# Patient Record
Sex: MALE
[De-identification: names, ages, dates, MRNs, and addresses within clinical notes are randomized per-mention and may not be internally consistent; named-entity substitution may affect disease eponyms.]

---

## 2017-09-14 NOTE — CP.PCM.HP
History of Present Illness





- History of Present Illness


History of Present Illness: 





COMPREHENSIVE   HISTORY & PHYSICAL EXAM 


   


HPI


RLQ PAIN SINCE LAST 48 HRS NOT A/W N/V/FEVER/CHILLS 


CT ABD. INFLAMMATION OF STUMP APPENDIX WITH FECOLITH 


HAD APPENDIDECTOMY FEW YEARS AGO





PAST HIST.


DAISY/ANXIETY/FX LEFT FOOT 


PERSONAL HIST:   Smoking.   N   Alcohol.   N      Allergy N            Travel_-

      .


FAMILY HIST : 


ROS :


Constitutional: Negative for weight change, chills, night sweats 


  Eyes: Negative for redness, swelling, itching, discharge, vision changes, 

blurry vision, double vision, glaucoma, cataracts, 


  Ears: Negative for hearing loss, ringing, , tinnitus, vertigo


 Nose: Negative for rhinorrhea, stuffiness, sniffing, itching, postnasal drip, 

discoloration, nasal congestion and epistaxis. 


 Throat: Negative for throat clearing, sore throat, hoarseness, difficulty 

swallowing and difficulty speaking. 


  Respiratory: Negative for cough, , sputum production, chest tightness,  

wheezing, pleuritic chest pain ,daytime somnolence, chronic cough, hemoptysis, 

snoring at night,


 Cardiovascular: Negative for chest pain, palpitations, orthopnea, PND, Edema 

of legs, leg cramps, angina, claudication, , irregular heartbeat,


 Neurology: Negative for irritability, muscle weakness, numbness and tingling, 

seizures, tremors, migraines, slurred speech, syncope, memory loss, mood changes

, recurrent headaches 


Gastrointestinal: Negative for difficulty swallowing, diarrhea, constipation, 

black stools, rectal bleeding, nausea, flatulence, reflux, poor appetite, 

changes in bowel habits  POS , abdominal pain


  Genitourinary: Negative for frequent urination, hematuria, discharge, 

incontinence, urinary retention, frequent UTI, Psychiatric: Negative for 

depression, anxiety/panic, suicidal tendencies, 


Musculoskeletal: Negative for swollen joints, back pain, , neck pain, morning 

stiffness of joints, . 


 Skin: Negative for rash, ulcers, itching, dry skin and pigmented lesions.


P/E: 


  Constitutional: Appears stated age and in no apparent distress. 


 Head: Normocephalic. 


  Ears: External ear canals patent without inflammation. Tympanic membranes 

intact with normal light reflex and landmark. 


  Eyes: Pupils are central, bilaterally equal, symmetrical and reacts to light 

with normal movements and no icterus or pallor. 


 Nose: External nares are patent. Mucosa is pink  Mouth-Throat: Good general 

appearance and condition. No post-pharyngeal/oropharyngeal erythema and 

tonsillar hypertrophy. Good dental hygiene. 


  Neck-Lymphatic: Neck is supple with normal ROM, no thyromegaly, lymph nodes 

or masses. JVD is normal with no carotid bruit. 


  Lungs: Clear to percussion and auscultation with bilateral normal air entry. 


  Cardiovascular: S1 and S2 are normal with no murmurs, gallops and rub. 


  GI Exam: No hepatomegaly. Abdomen is soft and tender RLQ . No Organomegaly , 

masses or hernias are evident and bowel sounds are normal and active. 


  Neurology: Higher function and all cranial nerves intact, with no gross motor 

or sensory deficit. Superficial and deep reflexes are normal with downwards 

planters. No cerebellar deficit with normal gait. 


  Musculoskeletal: No tender spots with normal curvature of the spine with no 

swelling or restricted ROM of the small and large joints. 


  Extremities: Homans sign absent. Intact pulses with no pitting edema, calf 

tenderness or skin color changes. 


  Skin: No rash, eruptions or abnormal skin pigmentation





LAB/RADIOLOGY:


ASSESMENT :


STUMP APPENDICITIS 


DAISY 





PLAN: 


SURGICAL EVAL FOR OR 


IV AB 








Present on Admission





- Present on Admission


Any Indicators Present on Admission: No





Past Patient History





- Infectious Disease


Hx of Infectious Diseases: None





- Past Social History


Smoking Status: Never Smoked





- PSYCHIATRIC


Hx Substance Use: Yes (Marijuana)





- SURGICAL HISTORY


Hx Appendectomy: Yes





- ANESTHESIA


Hx Anesthesia: Yes


Hx Anesthesia Reactions: No


Hx Malignant Hyperthermia: No





Meds


Allergies/Adverse Reactions: 


 Allergies











Allergy/AdvReac Type Severity Reaction Status Date / Time


 


No Known Allergies Allergy   Verified 06/03/16 18:00














Results





- Vital Signs


Recent Vital Signs: 





 Last Vital Signs











Temp  98.1 F   09/14/17 11:30


 


Pulse  68   09/14/17 11:30


 


Resp  18   09/14/17 11:30


 


BP  103/60   09/14/17 11:30


 


Pulse Ox  97   09/14/17 11:30














- Labs


Result Diagrams: 


 09/14/17 04:33





 09/14/17 04:33

## 2017-09-14 NOTE — C.PDOC
History Of Present Illness


27 year old male who presents to the ER after waking up with RLQ pain, 

associated with constipation. Patient reports he has a SHx of appendectomy at 6 yo in [place]; denies fever, nausea, or vomiting.


Time Seen by Provider: 09/14/17 04:14


Chief Complaint (Nursing): Abdominal Pain


History Per: Patient


History/Exam Limitations: no limitations


Onset/Duration Of Symptoms: Hrs


Current Symptoms Are (Timing): Still Present


Location Of Pain/Discomfort: RLQ


Radiation Of Pain To:: None


Quality Of Discomfort: Unable To Describe


Associated Symptoms: Constipation.  denies: Fever, Nausea, Vomiting


Exacerbating Factors: None


Alleviating Factors: None


Recent travel outside of the [place]: No





Past Medical History


Reviewed: Historical Data, Nursing Documentation, Vital Signs


Vital Signs: 


 Last Vital Signs











Temp  98.4 F   09/14/17 03:58


 


Pulse  100 H  09/14/17 03:58


 


Resp  16   09/14/17 03:58


 


BP  125/82   09/14/17 03:58


 


Pulse Ox  100   09/14/17 06:48














- Medical History


PMH: No Chronic Diseases


Surgical History: Appendectomy


Family History: States: Unknown Family Hx





- Social History


Hx Alcohol Use: Yes


Hx Substance Use: Yes (Marijuana)





- Immunization History


Hx Tetanus Toxoid Vaccination: No


Hx Influenza Vaccination: No


Hx Pneumococcal Vaccination: No





Review Of Systems


Constitutional: Negative for: Fever, Chills


Gastrointestinal: Positive for: Abdominal Pain, Constipation.  Negative for: 

Nausea, Vomiting





Physical Exam





- Physical Exam


Appears: Non-toxic, Other (uncomfortable)


Skin: Normal Color, Warm, Dry


Head: Atraumatic, Normacephalic


Eye(s): bilateral: Normal Inspection, EOMI


Nose: Normal


Oral Mucosa: Moist


Chest: Symmetrical, No Tenderness


Cardiovascular: Rhythm Regular, No Murmur


Respiratory: Normal Breath Sounds, No Rales, No Rhonchi, No Wheezing


Gastrointestinal/Abdominal: Soft, Tenderness (RLQ)


Neurological/Psych: Oriented x3, Normal Speech, Normal Cognition





ED Course And Treatment





- Laboratory Results


Result Diagrams: 


 09/14/17 04:33





 09/14/17 04:33


O2 Sat by Pulse Oximetry: 100 (Room air)


Pulse Ox Interpretation: Normal





- CT Scan/US


  ** CT abd/pel


Other Rad Studies (CT/US): Read By Radiologist, Radiology Report Reviewed


CT/US Interpretation: EXAM:  CT Abdomen and Pelvis With Intravenous Contrast.  

CLINICAL HISTORY:  27 years old, male; Pain; Abdominal pain.  TECHNIQUE:  Axial 

computed tomography images of the abdomen and pelvis with intravenous contrast. 

All CT.  scans at this facility use one or more dose reduction techniques, viz.

: automated exposure control;.  ma/kV adjustment per patient size (including 

targeted exams where dose is matched to indication; i.e.  head); or iterative 

reconstruction technique.  Coronal and sagittal reformatted images were created 

and reviewed.  CONTRAST:  100 mL of [id number] administered intravenously.  

COMPARISON:  No relevant prior studies available.  FINDINGS:  Lower thorax: 

There is minimal bibasilar atelectasis.  ABDOMEN:  Liver: There are no focal 

liver lesions present.  Gallbladder and bile ducts: The gallbladder is 

contracted but otherwise normal. No calcified stones.  No ductal dilation.  

Pancreas: The pancreas is normal. No ductal dilation.  Spleen: The spleen is 

normal.  Adrenals: The adrenal glands are normal.  Kidneys and ureters: The 

kidneys are normal. No hydronephrosis.  Stomach and bowel: Stomach is 

decompressed. There is no evidence of intestinal obstruction. No.  mucosal 

thickening.  Appendix: There is an ill-defined and grossly abnormal appendix in 

the right lower quadrant with an.  appendicolith. Findings compatible with 

situs. Early perforation is not excluded based on the illdefined.  appearance 

of the appendix. No evidence for organized abscess at this time.  PELVIS:  

Bladder: Bladder is decompressed.  Reproductive: The prostate gland and seminal 

vesicles are normal.  ABDOMEN and PELVIS:  Intraperitoneal space: There is no 

evidence of free intraperitoneal fluid. There is no free.  intraperitoneal air.

  Bones/joints: No acute fracture. No dislocation.  Soft tissues: Unremarkable.

  Vasculature: The aorta is normal. No abdominal aortic aneurysm.  Lymph nodes: 

There is no evidence of lymphadenopathy.  IMPRESSION:  There is an ill-defined 

and grossly abnormal appendix in the right lower quadrant with an.  

appendicolith. Findings compatible with situs. Early perforation is not 

excluded based on the.  ill-defined appearance of the appendix. No evidence for 

organized abscess at this time.


Progress Note: Blood work, urinalysis, and CT abd/pel ordered. Toradol and IV 

fluids administered.





Disposition





- Disposition


Disposition Time: 07:05


Condition: STABLE


Forms:  [employer] (English)





- Clinical Impression


Clinical Impression: 


 Appendicitis








- Scribe Statement


The provider has reviewed the documentation as recorded by the Scribe





Aaron Mcguire





All medical record entries made by the Scribe were at my direction and 

personally dictated by me. I have reviewed the chart and agree that the record 

accurately reflects my personal performance of the history, physical exam, 

medical decision making, and the department course for this patient. I have 

also personally directed, reviewed, and agree with the discharge instructions 

and disposition.





Physician Patient Turnover


Patient Signed Over To: Kaitlin Whatley


Handoff Comments: Pending surgical evaluation.

## 2017-09-14 NOTE — CP.PCM.CON
History of Present Illness





- History of Present Illness


History of Present Illness: 





General Surgery - Dr. Chan





28yo M w/ hx of DAISY, anxiety, appendectomy 20yrs ago in [place], presenting w

/ RLQ abdominal pain since Tuesday night.  Pt states the pain began around 7pm 

and was sharp, located in the RLQ abdomen right below prior surgical scar, 

worse with sitting up and moving around.  He states the pain gradually became 

worse and he had to leave class early yesterday, then finally decided to come 

to the ED.  Pt admits to subjective feeling of fever/chills, denies any Nausea, 

vomiting, SOb/Chest pain, Dysuria, Hematuria, Constipation, Diarrhea. 





PMH: DAISY, Anxiety, ankle fx


PSH: appendectomy, tonsillectomy


No meds


NKDA\





Pt was seen in the Ed.  Labs significant for WBC of 17.2, Tbili also elevated 

at 3.3.  CT abdomen/pelvis was done which showed inflammation surrounding the 

cecum with a fecalith/appendicolith visualized, poss. suggesting stump 

appendicitis. 





Review of Systems





- Review of Systems


All systems: reviewed and no additional remarkable complaints except (as per HPI

)





Past Patient History





- Infectious Disease


Hx of Infectious Diseases: None





- Past Social History


Smoking Status: Never Smoked





- PSYCHIATRIC


Hx Substance Use: Yes (Marijuana)





- SURGICAL HISTORY


Hx Appendectomy: Yes





- ANESTHESIA


Hx Anesthesia: Yes


Hx Anesthesia Reactions: No


Hx Malignant Hyperthermia: No





Meds


Allergies/Adverse Reactions: 


 Allergies











Allergy/AdvReac Type Severity Reaction Status Date / Time


 


No Known Allergies Allergy   Verified 06/03/16 18:00














- Medications


Medications: 


 Current Medications





Sodium Chloride (Sodium Chloride 0.9%)  1,000 mls @ 100 mls/hr IV .Q10H [facility]


   Last Admin: 09/14/17 09:45 Dose:  100 mls/hr


Piperacillin Sod/Tazobactam Sod (Zosyn 3.375 Gm Iv Premix)  3.375 gm in 50 mls 

@ 100 mls/hr IVPB Q6H [facility]


Morphine Sulfate (Morphine)  4 mg IVP Q4H [facility]


Ondansetron HCl (Zofran Inj)  4 mg IVP Q6 PRN


   PRN Reason: Nausea/Vomiting


Pantoprazole Sodium (Protonix Inj)  40 mg IVP DAILY [facility]











Physical Exam





- Constitutional


Appears: No Acute Distress





- Head Exam


Head Exam: ATRAUMATIC, NORMAL INSPECTION, NORMOCEPHALIC





- Eye Exam


Eye Exam: Normal appearance





- Respiratory Exam


Respiratory Exam: NORMAL BREATHING PATTERN.  absent: Respiratory Distress





- Cardiovascular Exam


Cardiovascular Exam: REGULAR RHYTHM





- GI/Abdominal Exam


GI & Abdominal Exam: Guarding (rlq), Rebound, Soft, Tenderness (RLQ, Mcburney's 

point ttp).  absent: Distended, Hernia, Rigid





- Neurological Exam


Neurological exam: Alert, Oriented x3





- Psychiatric Exam


Psychiatric exam: Normal Affect, Normal Mood





- Skin


Skin Exam: Dry, Intact





Results





- Vital Signs


Recent Vital Signs: 


 Last Vital Signs











Temp  98.2 F   09/14/17 07:40


 


Pulse  73   09/14/17 07:40


 


Resp  16   09/14/17 03:58


 


BP  95/58 L  09/14/17 07:40


 


Pulse Ox  97   09/14/17 07:40














- Labs


Result Diagrams: 


 09/14/17 04:33





 09/14/17 04:33


Labs: 


 Laboratory Results - last 24 hr











  09/14/17 09/14/17 09/14/17





  04:33 04:33 05:46


 


WBC  17.2 H D  


 


RBC  5.01  


 


Hgb  15.6  


 


Hct  45.2  


 


MCV  90.2  


 


MCH  31.2 H  


 


MCHC  34.5  


 


RDW  13.6  


 


Plt Count  239  


 


MPV  8.0  


 


Neut % (Auto)  78.5 H  


 


Lymph % (Auto)  10.8 L  


 


Mono % (Auto)  8.6  


 


Eos % (Auto)  1.5  


 


Baso % (Auto)  0.6  


 


Neut #  13.5 H  


 


Lymph #  1.9  


 


Mono #  1.5 H  


 


Eos #  0.3  


 


Baso #  0.1  


 


Sodium   137 


 


Potassium   3.8 


 


Chloride   97 L 


 


Carbon Dioxide   24 


 


Anion Gap   20 


 


BUN   15 


 


Creatinine   0.6 L 


 


Est GFR ( Amer)   > 60 


 


Est GFR (Non-Af Amer)   > 60 


 


Random Glucose   95 


 


Calcium   9.3 


 


Total Bilirubin   3.3 H 


 


AST   21 


 


ALT   32 


 


Alkaline Phosphatase   64 


 


Total Protein   7.5 


 


Albumin   4.3 


 


Globulin   3.3 


 


Albumin/Globulin Ratio   1.3 


 


Urine Color    Yellow


 


Urine Clarity    Clear


 


Urine pH    7.0


 


Ur Specific Gravity    1.020


 


Urine Protein    Negative


 


Urine Glucose (UA)    Normal


 


Urine Ketones    Negative


 


Urine Blood    Negative


 


Urine Nitrate    Negative


 


Urine Bilirubin    Negative


 


Urine Urobilinogen    2.0


 


Ur Leukocyte Esterase    Neg


 


Urine WBC (Auto)    < 1


 


Urine RBC (Auto)    < 1














- Imaging and Cardiology


  ** CT scan - abdomen


Status: Image reviewed by me, Report reviewed by me





Assessment & Plan





- Assessment and Plan (Free Text)


Assessment: 





28 yo M w/ RLQ abdominal pain, s/p appendectomy 20yrs ago, CT suggestive of 

stump appendicitis





-Admitted to medical service


-Cont. IV Abx, IVF


-Clear liquid diet


-Conservative Tx for now, will monitor and plan for OR tomorrow if no 

improvement





Dw Dr Rocio Pedersen PGY3


-

## 2017-09-14 NOTE — CT
EXAM:

  CT Abdomen and Pelvis With Intravenous Contrast



CLINICAL HISTORY:

  27 years old, male; Pain; Abdominal pain



TECHNIQUE:

  Axial computed tomography images of the abdomen and pelvis with intravenous 

contrast.  All CT scans at this facility use one or more dose reduction 

techniques, viz.: automated exposure control; ma/kV adjustment per patient size 

(including targeted exams where dose is matched to indication; i.e. head); or 

iterative reconstruction technique.

  Coronal and sagittal reformatted images were created and reviewed.



CONTRAST:

  100 mL of [id number] administered intravenously.



COMPARISON:

  No relevant prior studies available.



FINDINGS:

  Lower thorax:  There is minimal bibasilar atelectasis.



 ABDOMEN:

  Liver:  There are no focal liver lesions present.

  Gallbladder and bile ducts:  The gallbladder is contracted but otherwise 

normal.  No calcified stones.  No ductal dilation.

  Pancreas:  The pancreas is normal.  No ductal dilation.

  Spleen:  The spleen is normal.

  Adrenals:  The adrenal glands are normal.

  Kidneys and ureters:  The kidneys are normal.  No hydronephrosis.

  Stomach and bowel:  Stomach is decompressed.  There is no evidence of 

intestinal obstruction.  No mucosal thickening.

  Appendix:  There is an ill-defined and grossly abnormal appendix in the right 

lower quadrant with an appendicolith.  Findings compatible with situs.  Early 

perforation is not excluded based on the ill-defined appearance of the 

appendix.  No evidence for organized abscess at this time.



 PELVIS:

  Bladder:  Bladder is decompressed.

  Reproductive:  The prostate gland and seminal vesicles are normal.



 ABDOMEN and PELVIS:

  Intraperitoneal space:  There is no evidence of free intraperitoneal fluid.  

There is no free intraperitoneal air.

  Bones/joints:  No acute fracture.  No dislocation.

  Soft tissues:  Unremarkable.

  Vasculature:  The aorta is normal.  No abdominal aortic aneurysm.

  Lymph nodes:  There is no evidence of lymphadenopathy.



IMPRESSION:     

  There is an ill-defined and grossly abnormal appendix in the right lower 

quadrant with an appendicolith.  Findings compatible with situs.  Early 

perforation is not excluded based on the ill-defined appearance of the 

appendix.  No evidence for organized abscess at this time.

## 2017-09-15 NOTE — CT
PROCEDURE:  CT Abdomen and Pelvis with contrast



HISTORY:

Compare right lower quadrant abscess. 



Relevant surgical history: Prior appendectomy lymph 



COMPARISON:

September 14, 2017. 



TECHNIQUE:

Contrast dose: Oral contrast only. 



Radiation dose:



Total exam DLP = 463.34 mGy-cm.



This CT exam was performed using one or more of the following dose 

reduction techniques: Automated exposure control, adjustment of the 

mA and/or kV according to patient size, and/or use of iterative 

reconstruction technique.



FINDINGS:



LOWER THORAX:

Unremarkable. 



LIVER:

Unremarkable. No gross lesion or ductal dilatation. 



GALLBLADDER AND BILE DUCTS:

Unremarkable. 



PANCREAS:

Unremarkable. No gross lesion or ductal dilatation.



SPLEEN:

Unremarkable. 



ADRENALS:

Unremarkable. No mass. 



KIDNEYS AND URETERS:

Unremarkable. No hydronephrosis. No solid mass. 



VASCULATURE:

Unremarkable. No aortic aneurysm. 



BOWEL:

Irregular mass at the base of the cecum better delineated on the 

current study given the oral contrast. Inflammatory, phlegmonous 

process measures 3.2 x 3.6 cm. Inflammatory changes, edema noted with 

respect to the adjacent right psoas muscle. There is no drainable 

component to this collection.



Diverticulosis without an acute inflammatory component or other 

associated pathologic process.



APPENDIX:

Prior appendectomy. 



PERITONEUM:

Unremarkable. No free fluid. No free air. 



LYMPH NODES:

Unremarkable. No enlarged lymph nodes. 



BLADDER:

Unremarkable. 



REPRODUCTIVE:

Unremarkable. 



BONES:

No acute fracture. 



OTHER FINDINGS:

None.



IMPRESSION:

Phlegmonous, inflammatory process base of cecum. Local extension of 

into right lower quadrant fat and adjacent psoas muscle. This 

measures 3.2 x 3.6 cm. No drainable component.  Accounting for 

differences in technique, approximate stability compared to the prior 

study September 14, 2017 completed at 05:48.

## 2017-09-15 NOTE — CP.PCM.PN
Subjective





- Date & Time of Evaluation


Date of Evaluation: 09/15/17


Time of Evaluation: 07:19





- Subjective


Subjective: 


Patient was seen and examined at bedside. Patient reports feeling better today 

and pain has improved. Patient is tolerating liquid diet. 12 point review of 

systems otherwise negative.





Objective





- Vital Signs/Intake and Output


Vital Signs (last 24 hours): 


 











Temp Pulse Resp BP Pulse Ox


 


 98.7 F   88   20   112/68   96 


 


 09/15/17 00:00  09/15/17 00:00  09/15/17 00:00  09/15/17 00:00  09/15/17 00:00








Intake and Output: 


 











 09/15/17 09/15/17





 06:59 18:59


 


Intake Total 1890 


 


Output Total 600 


 


Balance 1290 














- Medications


Medications: 


 Current Medications





Sodium Chloride (Sodium Chloride 0.9%)  1,000 mls @ 100 mls/hr IV .Q10H [facility]


   Last Admin: 09/15/17 06:11 Dose:  Not Given


Piperacillin Sod/Tazobactam (Sod 3.375 gm/ Sodium Chloride)  100 mls @ 100 mls/

hr IVPB Q6H [facility]


   Last Admin: 09/15/17 03:32 Dose:  100 mls/hr


Morphine Sulfate (Morphine)  4 mg IVP Q4H PRN


   PRN Reason: Pain, moderate (4-7)


   Last Admin: 09/14/17 23:59 Dose:  4 mg


Ondansetron HCl (Zofran Inj)  4 mg IVP Q6 PRN


   PRN Reason: Nausea/Vomiting


Pantoprazole Sodium (Protonix Inj)  40 mg IVP DAILY [facility]


   Last Admin: 09/14/17 11:29 Dose:  40 mg











- Head Exam


Head Exam: ATRAUMATIC, NORMAL INSPECTION





- Eye Exam


Eye Exam: EOMI, Normal appearance





- ENT Exam


ENT Exam: Mucous Membranes Moist





- Respiratory Exam


Respiratory Exam: NORMAL BREATHING PATTERN.  absent: Respiratory Distress





- Cardiovascular Exam


Cardiovascular Exam: +S1, +S2.  absent: Bradycardia, Tachycardia





- GI/Abdominal Exam


GI & Abdominal Exam: Soft.  absent: Distended, Tenderness





- Neurological Exam


Neurological Exam: Alert, Awake, Oriented x3





- Psychiatric Exam


Psychiatric exam: Normal Affect, Normal Mood





- Skin


Skin Exam: Dry, Intact, Normal Color, Warm





Assessment and Plan





- Assessment and Plan (Free Text)


Assessment: 


27 year old male with CT suggesting stump appendicitis, history of appendectomy 

20 years ago.





- Continue clear liquid diet. 


- Continue antibiotics, pain control, and IV fluids.


- Continue medical management as per hospitalist team.

## 2017-09-15 NOTE — CP.PCM.PN
Subjective





- Date & Time of Evaluation


Date of Evaluation: 09/15/17


Time of Evaluation: 13:53





- Subjective


Subjective: 





AFEBRILE 


WBC DOWN TO 11.9


LESS PAIN 


NO N/V 


SURGERY : NO INTERVENTION 


ID FOR IV AB 





Objective





- Vital Signs/Intake and Output


Vital Signs (last 24 hours): 


 











Temp Pulse Resp BP Pulse Ox


 


 98.7 F   88   20   112/68   96 


 


 09/15/17 00:00  09/15/17 00:00  09/15/17 00:00  09/15/17 00:00  09/15/17 00:00








Intake and Output: 


 











 09/15/17 09/15/17





 11:59 23:59


 


Intake Total 850 


 


Balance 850 














- Medications


Medications: 


 Current Medications





Sodium Chloride (Sodium Chloride 0.9%)  1,000 mls @ 100 mls/hr IV .Q10H [facility]


   Last Admin: 09/15/17 06:11 Dose:  Not Given


Piperacillin Sod/Tazobactam Sod (Zosyn 3.375 Gm Iv Premix)  3.375 gm in 50 mls 

@ 100 mls/hr IVPB Q6H [facility]


   Last Admin: 09/15/17 08:11 Dose:  100 mls/hr


Morphine Sulfate (Morphine)  4 mg IVP Q4H PRN


   PRN Reason: Pain, moderate (4-7)


   Last Admin: 09/15/17 07:45 Dose:  4 mg


Ondansetron HCl (Zofran Inj)  4 mg IVP Q6 PRN


   PRN Reason: Nausea/Vomiting


   Last Admin: 09/15/17 09:57 Dose:  4 mg


Pantoprazole Sodium (Protonix Inj)  40 mg IVP DAILY [facility]


   Last Admin: 09/15/17 09:59 Dose:  40 mg


Potassium Chloride (K-Dur 20 Meq Er Tab)  20 meq PO ONCE ONE


   Stop: 09/15/17 14:01











- Labs


Labs: 


 





 09/15/17 08:02 





 09/15/17 08:02

## 2017-09-15 NOTE — CP.PCM.CON
History of Present Illness





- History of Present Illness


History of Present Illness: 


INFECTIOUS DISEASE CONSULT.


HPI;


26yo M w/ hx of DAISY, anxiety, appendectomy 20yrs ago in [place], presenting w

/ RLQ abdominal pain since Tuesday night.  Pt states the pain began around 7pm 

and was sharp, located in the RLQ abdomen right below prior surgical scar, 

worse with sitting up and moving around.  He states the pain gradually became 

worse and he had to leave class early yesterday, then finally decided to come 

to the ED.  Pt admits to subjective feeling of fever/chills, denies any Nausea, 

vomiting, SOB/Chest pain, Dysuria, Hematuria, Constipation, Diarrhea. 





On admission patient had leukocytosis of 17.2 with elevated bilirubin of 3.3.


CT of the abdomen and pelvis with IV contrast showed inflammation surrounding 

the cecum with fecalith/appendicolith visualized possible suggesting stump 

appendicitis.


Patient was appropriately cultured and started on IV Zosyn 3.375 every 6 hourly.





Infectious disease consultation requested by PMD for further evaluation.





Patient went for repeat CT of the abdomen and pelvis with by mouth contrast 

today which showed inflammatory phlegmonous process 3.2 x 3.6 cm at the base of 

cecum with local extension into the right lower quadrant/adjacent PSOAS muscle.

  No drainable component was noted.NO LYMPHADENOPATHY WAS SEEN.


Patient continues to have pain right lower quadrant radiating to the back.  

Presently he states it is 6 /10 count.


Patient denies any melena.  Denies any diarrhea at present.








PMH: DAISY, Anxiety, ankle fx


PSH: appendectomy, tonsillectomy


No meds


ALLERGY;  NKDA











Review of Systems





- Constitutional


Constitutional: Chills, Fever (ON ADMISSION.)





- EENT


Eyes: absent: Photophobia


Nose/Mouth/Throat: absent: Mouth Lesions





- Cardiovascular


Cardiovascular: absent: Chest Pain, Dyspnea





- Respiratory


Respiratory: absent: Cough, Hemoptysis





- Gastrointestinal


Gastrointestinal: Abdominal Pain (RIGHT LOWER QUADRANT RADIATING TO THE BACK.), 

Constipation.  absent: Diarrhea, Nausea





- Genitourinary


Genitourinary: absent: Dysuria, Hematuria, Bladder Distension





- Musculoskeletal


Musculoskeletal: absent: Back Pain





- Neurological


Neurological: absent: Headaches





- Hematologic/Lymphatic


Hematologic: As Per HPI.  absent: Easy Bleeding, Easy Bruising, Lymphadenopathy





Past Patient History





- Infectious Disease


Hx of Infectious Diseases: None





- Past Medical History & Family History


Past Medical History?: Yes





- Past Social History


Smoking Status: Never Smoked





- CARDIAC


Hx Cardiac Disorders: No





- PULMONARY


Hx Sleep Apnea: Yes





- NEUROLOGICAL


Hx Neurological Disorder: No





- HEENT


Hx HEENT Problems: No





- RENAL


Hx Chronic Kidney Disease: No





- ENDOCRINE/METABOLIC


Hx Endocrine Disorders: No





- HEMATOLOGICAL/ONCOLOGICAL


Hx Blood Disorders: No





- INTEGUMENTARY


Hx Dermatological Problems: No





- MUSCULOSKELETAL/RHEUMATOLOGICAL


Hx Musculoskeletal Disorders: No


Hx Falls: No





- GASTROINTESTINAL


Hx Gastrointestinal Disorders: No





- GENITOURINARY/GYNECOLOGICAL


Hx Genitourinary Disorders: No





- PSYCHIATRIC


Hx Substance Use: Yes (sheron)





- SURGICAL HISTORY


Hx Appendectomy: Yes


Hx Tonsillectomy: Yes





- ANESTHESIA


Hx Anesthesia: Yes


Hx Anesthesia Reactions: No


Hx Malignant Hyperthermia: No





Meds


Allergies/Adverse Reactions: 


 Allergies











Allergy/AdvReac Type Severity Reaction Status Date / Time


 


No Known Allergies Allergy   Verified 06/03/16 18:00














- Medications


Medications: 


 Current Medications





Sodium Chloride (Sodium Chloride 0.9%)  1,000 mls @ 100 mls/hr IV .Q10H [facility]


   Last Admin: 09/15/17 14:45 Dose:  100 mls/hr


Piperacillin Sod/Tazobactam Sod (Zosyn 3.375 Gm Iv Premix)  3.375 gm in 50 mls 

@ 100 mls/hr IVPB Q6H [facility]


   Last Admin: 09/15/17 14:39 Dose:  100 mls/hr


Morphine Sulfate (Morphine)  4 mg IVP Q4H PRN


   PRN Reason: Pain, moderate (4-7)


   Last Admin: 09/15/17 07:45 Dose:  4 mg


Ondansetron HCl (Zofran Inj)  4 mg IVP Q6 PRN


   PRN Reason: Nausea/Vomiting


   Last Admin: 09/15/17 09:57 Dose:  4 mg


Pantoprazole Sodium (Protonix Inj)  40 mg IVP DAILY [facility]


   Last Admin: 09/15/17 09:59 Dose:  40 mg











Physical Exam





- Constitutional


Appears: No Acute Distress





- Head Exam


Head Exam: NORMAL INSPECTION





- Eye Exam


Eye Exam: EOMI, PERRL, Scleral icterus





- ENT Exam


ENT Exam: Normal Oropharynx





- Neck Exam


Neck exam: Positive for: Normal Inspection





- Respiratory Exam


Respiratory Exam: Clear to Auscultation Bilateral, NORMAL BREATHING PATTERN





- Cardiovascular Exam


Cardiovascular Exam: REGULAR RHYTHM, +S1, +S2





- GI/Abdominal Exam


GI & Abdominal Exam: Normal Bowel Sounds, Soft, Tenderness (RIGHT LOWER 

QUADRANT WITH SOME TENDERNESS RADIATING TO THE BACK.SCAR OF PREVIOUS SURGERY 

NOTED RIGHT LOWER QUADRANT.).  absent: Organomegaly





- Extremities Exam


Extremities exam: Positive for: pedal pulses present.  Negative for: calf 

tenderness, pedal edema





- Neurological Exam


Neurological exam: Alert, CN II-XII Intact, Oriented x3, Reflexes Normal





- Psychiatric Exam


Psychiatric exam: Normal Mood





- Skin


Skin Exam: Normal Color, Warm





Results





- Vital Signs


Recent Vital Signs: 


 Last Vital Signs











Temp  98.7 F   09/15/17 00:00


 


Pulse  88   09/15/17 00:00


 


Resp  20   09/15/17 00:00


 


BP  112/68   09/15/17 00:00


 


Pulse Ox  96   09/15/17 00:00














- Labs


Result Diagrams: 


 09/15/17 08:02





 09/15/17 08:02


Labs: 


 Laboratory Results - last 24 hr











  09/15/17 09/15/17





  08:02 08:02


 


WBC  11.9 H 


 


RBC  4.52 


 


Hgb  14.2 


 


Hct  41.2 


 


MCV  91.3 


 


MCH  31.5 H 


 


MCHC  34.5 


 


RDW  13.4 


 


Plt Count  213 


 


MPV  8.0 


 


Neut % (Auto)  72.5 


 


Lymph % (Auto)  13.8 L 


 


Mono % (Auto)  11.5 H 


 


Eos % (Auto)  1.9 


 


Baso % (Auto)  0.3 


 


Neut #  8.7 H 


 


Lymph #  1.6 


 


Mono #  1.4 H 


 


Eos #  0.2 


 


Baso #  0.0 


 


Sodium   136


 


Potassium   3.5 L


 


Chloride   99


 


Carbon Dioxide   25


 


Anion Gap   16


 


BUN   8 L


 


Creatinine   0.7 L


 


Est GFR ( Amer)   > 60


 


Est GFR (Non-Af Amer)   > 60


 


Random Glucose   92


 


Calcium   9.1


 


Total Bilirubin   4.6 H


 


AST   22


 


ALT   30


 


Alkaline Phosphatase   68


 


Total Protein   6.8


 


Albumin   3.8


 


Globulin   3.1


 


Albumin/Globulin Ratio   1.2














- Imaging and Cardiology


  ** CT scan - abdomen/PELVIS WITH BY MOUTH CONTRAST


Additional comment: 





SEE FULL REPORT.





Assessment & Plan


(1) Acute cecitis


Status: Acute   





(2) Cecal diverticulitis


Status: Acute   





(3) Stercolith of appendix


Status: Acute   





(4) Sleep apnea syndrome


Status: Acute   





- Assessment and Plan (Free Text)


Plan: 





PLAN;


PANCULTURES.


CRP,ESR.


HEPATITIS SCREEN A /B.


HEPATITIS c ANTIBODY


LIVER FUNCTION TESTS





CONTINUE iv zOSYN 3.375 DECREASE  DOSE  EVERY 8 HOURLY 9/14/17.


ADD IV VANCOMYCIN 1 G EVERY 12 HOURLY FOR GRAM-POSITIVE AND mrsa COVERAGE.


fOLLOW-UP vANCO TROUGH LEVELS PRIOR TO THE FOURTH DOSE AND KEEP THEM BETWEEN 10 

AND 20.





fOLLOW-UP RENAL FUNCTIONS


fOLLOW-UP lftS CLOSELY..





WILL DISCUSS WITH SURGERY.


CASE DISCUSSED WITH FAMILY AT BEDSIDE.

## 2017-09-15 NOTE — CP.PCM.PN
Subjective





- Date & Time of Evaluation


Date of Evaluation: 09/15/17


Time of Evaluation: 07:13





- Subjective


Subjective: 





General Surgery - Dr. Chan








PT S&E.  ROSARIO.  Pt doing well this morning and states his pain is improving.  

He tolerated liquid diet yesterday with no N/V.  HE denies any F/C, SOb/Cp, 

Dysuria, Hematuria, Diarrhea, Constipation.





Objective





- Vital Signs/Intake and Output


Vital Signs (last 24 hours): 


 











Temp Pulse Resp BP Pulse Ox


 


 98.7 F   88   20   112/68   96 


 


 09/15/17 00:00  09/15/17 00:00  09/15/17 00:00  09/15/17 00:00  09/15/17 00:00








Intake and Output: 


 











 09/15/17 09/15/17





 06:59 18:59


 


Intake Total 1890 


 


Output Total 600 


 


Balance 1290 














- Medications


Medications: 


 Current Medications





Sodium Chloride (Sodium Chloride 0.9%)  1,000 mls @ 100 mls/hr IV .Q10H [facility]


   Last Admin: 09/15/17 06:11 Dose:  Not Given


Piperacillin Sod/Tazobactam (Sod 3.375 gm/ Sodium Chloride)  100 mls @ 100 mls/

hr IVPB Q6H [facility]


   Last Admin: 09/15/17 03:32 Dose:  100 mls/hr


Morphine Sulfate (Morphine)  4 mg IVP Q4H PRN


   PRN Reason: Pain, moderate (4-7)


   Last Admin: 09/14/17 23:59 Dose:  4 mg


Ondansetron HCl (Zofran Inj)  4 mg IVP Q6 PRN


   PRN Reason: Nausea/Vomiting


Pantoprazole Sodium (Protonix Inj)  40 mg IVP DAILY [facility]


   Last Admin: 09/14/17 11:29 Dose:  40 mg











- Constitutional


Appears: No Acute Distress





- Head Exam


Head Exam: ATRAUMATIC, NORMAL INSPECTION, NORMOCEPHALIC





- Respiratory Exam


Respiratory Exam: NORMAL BREATHING PATTERN.  absent: Respiratory Distress





- Cardiovascular Exam


Cardiovascular Exam: REGULAR RHYTHM





- GI/Abdominal Exam


GI & Abdominal Exam: Soft, Tenderness (mild ttp RLQ, improved).  absent: 

Distended, Firm, Guarding, Rebound





- Neurological Exam


Neurological Exam: Alert, Oriented x3





- Psychiatric Exam


Psychiatric exam: Normal Affect, Normal Mood





- Skin


Skin Exam: Dry, Intact





Assessment and Plan





- Assessment and Plan (Free Text)


Assessment: 





26 yo M w/ RLQ abdominal pain, s/p appendectomy 20yrs ago, CT suggestive of 

stump appendicitis





-Continue liquid diet


-Cont. IV Abx, IVF


-F/U AM Labs


-Poss. diet advancement later today to soft.  pt. instructed to continue low 

residue/soft diet upon discharge





Dw Dr Rocio Pedersen PGY3

## 2017-09-16 NOTE — CP.PCM.PN
Subjective





- Date & Time of Evaluation


Date of Evaluation: 09/16/17


Time of Evaluation: 14:01





- Subjective


Subjective: 





AFEBRILE 


LESS PAIN 


CT ABD. REPEAT , PHLEGMONOUS , CECITIS 


IV AB AND PAIN MANAGEMENT 





Objective





- Vital Signs/Intake and Output


Vital Signs (last 24 hours): 


 











Temp Pulse Resp BP Pulse Ox


 


 98.2 F   65   20   111/61   98 


 


 09/16/17 07:57  09/16/17 07:57  09/16/17 07:57  09/16/17 07:57  09/16/17 07:57











- Medications


Medications: 


 Current Medications





Piperacillin Sod/Tazobactam Sod (Zosyn 3.375 Gm Iv Premix)  3.375 gm in 50 mls 

@ 100 mls/hr IVPB Q6H [facility]


   Last Admin: 09/16/17 10:13 Dose:  100 mls/hr


Vancomycin/Sodium Chloride (Vancocin)  1 gm in 200 mls @ 133 mls/hr IVPB Q12 [facility]


   Stop: 09/20/17 22:01


   Last Admin: 09/16/17 10:53 Dose:  133 mls/hr


Sodium Chloride (Sodium Chloride 0.9%)  1,000 mls @ 50 mls/hr IV .Q20H [facility]


   Last Admin: 09/16/17 10:11 Dose:  Not Given


Morphine Sulfate (Morphine)  4 mg IVP Q4H PRN


   PRN Reason: Pain, moderate (4-7)


   Last Admin: 09/16/17 10:25 Dose:  4 mg


Ondansetron HCl (Zofran Inj)  4 mg IVP Q6 PRN


   PRN Reason: Nausea/Vomiting


   Last Admin: 09/15/17 09:57 Dose:  4 mg


Pantoprazole Sodium (Protonix Inj)  40 mg IVP DAILY [facility]


   Last Admin: 09/16/17 10:13 Dose:  40 mg











- Labs


Labs: 


 





 09/16/17 07:51 





 09/16/17 07:51

## 2017-09-16 NOTE — CP.PCM.PN
Subjective





- Date & Time of Evaluation


Date of Evaluation: 09/16/17


Time of Evaluation: 20:14





- Subjective


Subjective: 





AFEBRILE


STATES MUCH IMPROVED.


ABDOMINAL PAIN MUCH IMPROVED





SURGERY F/U NOTED.


NO IMMEDIATE  SURGICAL INTERVENTION AS PER SURGERY.





PT TOLERATING IV ABX.


ON IV ZOSYN /IV VANCOMYCIN 





LABS REVIEWED;


WBC 8.7





CREAT 0.8/BUN 9 





HEPATITIS SCREEN A,B C -VE


LFTS IMPROVING .BILI 2.5, TRANSAMINASES -N.





Objective





- Vital Signs/Intake and Output


Vital Signs (last 24 hours): 


 











Temp Pulse Resp BP Pulse Ox


 


 98.2 F   60   20   97/61 L  96 


 


 09/16/17 15:09  09/16/17 15:09  09/16/17 15:09  09/16/17 15:09  09/16/17 15:09








Intake and Output: 


 











 09/16/17 09/17/17





 18:59 06:59


 


Intake Total 850 


 


Output Total 500 


 


Balance 350 














- Medications


Medications: 


 Current Medications





Piperacillin Sod/Tazobactam Sod (Zosyn 3.375 Gm Iv Premix)  3.375 gm in 50 mls 

@ 100 mls/hr IVPB Q6H [facility]


   Last Admin: 09/16/17 14:08 Dose:  100 mls/hr


Vancomycin/Sodium Chloride (Vancocin)  1 gm in 200 mls @ 133 mls/hr IVPB Q12 [facility]


   Stop: 09/20/17 22:01


   Last Admin: 09/16/17 10:53 Dose:  133 mls/hr


Sodium Chloride (Sodium Chloride 0.9%)  1,000 mls @ 50 mls/hr IV .Q20H [facility]


   Last Admin: 09/16/17 10:11 Dose:  Not Given


Morphine Sulfate (Morphine)  4 mg IVP Q4H PRN


   PRN Reason: Pain, moderate (4-7)


   Last Admin: 09/16/17 17:18 Dose:  4 mg


Ondansetron HCl (Zofran Inj)  4 mg IVP Q6 PRN


   PRN Reason: Nausea/Vomiting


   Last Admin: 09/15/17 09:57 Dose:  4 mg


Pantoprazole Sodium (Protonix Inj)  40 mg IVP DAILY [facility]


   Last Admin: 09/16/17 10:13 Dose:  40 mg











- Labs


Labs: 


 





 09/16/17 07:51 





 09/16/17 07:51 











- Constitutional


Appears: No Acute Distress





- Head Exam


Head Exam: NORMAL INSPECTION





- Eye Exam


Eye Exam: EOMI, PERRL





- ENT Exam


ENT Exam: Mucous Membranes Moist





- Neck Exam


Neck Exam: Normal Inspection





- Respiratory Exam


Respiratory Exam: Clear to Ausculation Bilateral





- Cardiovascular Exam


Cardiovascular Exam: REGULAR RHYTHM, +S1, +S2





- GI/Abdominal Exam


GI & Abdominal Exam: Soft, Tenderness (RLQ ON DEEPPALPATION.), Normal Bowel 

Sounds.  absent: Guarding





- Extremities Exam


Extremities Exam: Normal Capillary Refill.  absent: Calf Tenderness, Pedal Edema





- Neurological Exam


Neurological Exam: Alert, Awake, CN II-XII Intact, Oriented x3, Reflexes Normal





- Psychiatric Exam


Psychiatric exam: Normal Mood





- Skin


Skin Exam: Normal Color, Warm





Assessment and Plan


(1) Acute cecitis


Status: Acute   





(2) Cecal diverticulitis


Status: Acute   





(3) Stercolith of appendix


Status: Acute   





(4) Sleep apnea syndrome


Status: Acute   





- Assessment and Plan (Free Text)


Plan: 





CONTINUE iv zOSYN 3.375 DECREASE  DOSE  EVERY 8 HOURLY 9/14/17.


ON  IV VANCOMYCIN 1 G EVERY 12 HOURLY FOR GRAM-POSITIVE AND mrsa COVERAGE.


fOLLOW-UP vANCO TROUGH LEVELS PRIOR TO THE FOURTH DOSE AND KEEP THEM BETWEEN 10 

AND 20.





fOLLOW-UP RENAL FUNCTIONS.





PT WILL NEED A PICC LINE ON MONDAY


WILL SWITCH TO IV INVANZ 1GM IVPB DAILY X14 DAYS IF BLOOD CULTURES REMAIN -VE.


F/U CT ABDOMEN AFTER COMPLETION OF ABX .


F/U AS OPD.

## 2017-09-16 NOTE — CP.PCM.PN
Subjective





- Date & Time of Evaluation


Date of Evaluation: 09/16/17


Time of Evaluation: 08:25





- Subjective


Subjective: 





Surgery 





Pt s&e. NAEON. Denies F/C/N/V/D/CP/SOB. Pain controlled. +void. + amb





Objective





- Vital Signs/Intake and Output


Vital Signs (last 24 hours): 


 











Temp Pulse Resp BP Pulse Ox


 


 98.2 F   65   20   111/61   98 


 


 09/16/17 07:57  09/16/17 07:57  09/16/17 07:57  09/16/17 07:57  09/16/17 07:57








Intake and Output: 


 











 09/16/17 09/16/17





 06:59 18:59


 


Intake Total 1800 


 


Balance 1800 














- Medications


Medications: 


 Current Medications





Sodium Chloride (Sodium Chloride 0.9%)  1,000 mls @ 100 mls/hr IV .Q10H [facility]


   Last Admin: 09/16/17 05:56 Dose:  100 mls/hr


Piperacillin Sod/Tazobactam Sod (Zosyn 3.375 Gm Iv Premix)  3.375 gm in 50 mls 

@ 100 mls/hr IVPB Q6H [facility]


   Last Admin: 09/16/17 02:07 Dose:  100 mls/hr


Vancomycin/Sodium Chloride (Vancocin)  1 gm in 200 mls @ 133 mls/hr IVPB Q12 [facility]


   Stop: 09/20/17 22:01


   Last Admin: 09/15/17 21:48 Dose:  133 mls/hr


Morphine Sulfate (Morphine)  4 mg IVP Q4H PRN


   PRN Reason: Pain, moderate (4-7)


   Last Admin: 09/16/17 06:02 Dose:  4 mg


Ondansetron HCl (Zofran Inj)  4 mg IVP Q6 PRN


   PRN Reason: Nausea/Vomiting


   Last Admin: 09/15/17 09:57 Dose:  4 mg


Pantoprazole Sodium (Protonix Inj)  40 mg IVP DAILY [facility]


   Last Admin: 09/15/17 09:59 Dose:  40 mg











- Labs


Labs: 


 





 09/16/17 07:51 





 09/16/17 07:51 











- Constitutional


Appears: No Acute Distress





- Head Exam


Head Exam: ATRAUMATIC, NORMAL INSPECTION, NORMOCEPHALIC





- Eye Exam


Eye Exam: EOMI, Normal appearance, PERRL


Pupil Exam: NORMAL ACCOMODATION, PERRL





- ENT Exam


ENT Exam: Mucous Membranes Moist, Normal Exam





- Neck Exam


Neck Exam: Full ROM, Normal Inspection.  absent: Lymphadenopathy





- Respiratory Exam


Respiratory Exam: Clear to Ausculation Bilateral, NORMAL BREATHING PATTERN





- Cardiovascular Exam


Cardiovascular Exam: REGULAR RHYTHM, +S1, +S2.  absent: Murmur





- GI/Abdominal Exam


GI & Abdominal Exam: Soft, Tenderness, Normal Bowel Sounds.  absent: Distended, 

Firm, Guarding, Rigid, Rebound





- Extremities Exam


Extremities Exam: Full ROM, Normal Capillary Refill, Normal Inspection.  absent

: Joint Swelling, Pedal Edema





- Back Exam


Back Exam: NORMAL INSPECTION





- Neurological Exam


Neurological Exam: Alert, Awake, CN II-XII Intact, Normal Gait, Oriented x3





- Psychiatric Exam


Psychiatric exam: Normal Affect, Normal Mood





- Skin


Skin Exam: Dry, Intact, Normal Color, Warm





Assessment and Plan





- Assessment and Plan (Free Text)


Assessment: 





28 yo M w/ RLQ abdominal pain, s/p appendectomy 20yrs ago, CT suggestive of 

stump appendicitis, cecal diverticulitis


Repeat CT: Cecitis, Phlegmatous changes 3x3.5cm


Leukocytosis resolved





-Continue low residual diet


-Cont. IV Abx, IVF


-F/U Labs


-pt. instructed to continue low residue/soft diet upon discharge


-No emergent surgical intervention at this time. 


-Recommend outpatient elective surgery





Will Chaz Chan

## 2017-09-17 NOTE — CP.PCM.PN
Subjective





- Date & Time of Evaluation


Date of Evaluation: 09/17/17


Time of Evaluation: 14:29





- Subjective


Subjective: 





AFEBRILE 


LESS PAIN 


CT ABD. REPEAT , PHLEGMONOUS , CECITIS 


IV AB AND PAIN MANAGEMENT 





Objective





- Vital Signs/Intake and Output


Vital Signs (last 24 hours): 


 











Temp Pulse Resp BP Pulse Ox


 


 98.2 F   68   20   98/57 L  96 


 


 09/17/17 08:28  09/17/17 08:28  09/17/17 08:28  09/17/17 08:28  09/17/17 08:28








Intake and Output: 


 











 09/17/17 09/17/17





 11:59 23:59


 


Intake Total 530 


 


Balance 530 














- Medications


Medications: 


 Current Medications





Piperacillin Sod/Tazobactam Sod (Zosyn 3.375 Gm Iv Premix)  3.375 gm in 50 mls 

@ 100 mls/hr [facility]


   Last Admin: 09/17/17 14:22 Dose:  100 mls/hr


Vancomycin/Sodium Chloride (Vancocin)  1 gm in 200 mls @ 133 mls/hr IVPB Q12 [facility]


   Stop: 09/20/17 22:01


   Last Admin: 09/17/17 10:40 Dose:  133 mls/hr


Sodium Chloride (Sodium Chloride 0.9%)  1,000 mls @ 50 mls/hr IV [facility]H [facility]


   Last Admin: 09/17/17 04:20 Dose:  Not Given


Ondansetron HCl (Zofran Inj)  4 mg IVP Q6 PRN


   PRN Reason: Nausea/Vomiting


   Last Admin: 09/15/17 09:57 Dose:  4 mg


Oxycodone/Acetaminophen (Percocet 5/325 Mg Tab)  1 tab PO Q4H PRN


   PRN Reason: Pain, moderate (4-7)


   Stop: 09/20/17 13:46


   Last Admin: 09/17/17 14:21 Dose:  1 tab


Oxycodone/Acetaminophen (Percocet 5/325 Mg Tab)  2 tab PO Q4H PRN


   PRN Reason: Pain, severe (8-10)


   Stop: 09/20/17 13:46


Pantoprazole Sodium (Protonix Inj)  40 mg IVP [facility]ILY [facility]


   Last Admin: 09/17/17 09:02 Dose:  40 mg











- Labs


Labs: 


 





 09/17/17 08:36 





 09/16/17 07:51

## 2017-09-17 NOTE — CP.PCM.PN
Subjective





- Date & Time of Evaluation


Date of Evaluation: 09/17/17


Time of Evaluation: 22:48





- Subjective


Subjective: 





AFEBRILE


COMFORTABLE


ABDOMINAL PAIN MUCH IMPROVED





SURGERY F/U NOTED.


NO IMMEDIATE  SURGICAL INTERVENTION AS PER SURGERY.





PT TOLERATING IV ABX.


ON IV ZOSYN /IV VANCOMYCIN .


PT FOR PICC LINE IN AM 





Objective





- Vital Signs/Intake and Output


Vital Signs (last 24 hours): 


 











Temp Pulse Resp BP Pulse Ox


 


 97.5 F L  68   20   137/85   97 


 


 09/17/17 16:44  09/17/17 16:44  09/17/17 16:44  09/17/17 16:44  09/17/17 16:44








Intake and Output: 


 











 09/17/17 09/18/17





 18:59 06:59


 


Intake Total 780 800


 


Balance 780 800














- Medications


Medications: 


 Current Medications





Piperacillin Sod/Tazobactam Sod (Zosyn 3.375 Gm Iv Premix)  3.375 gm in 50 mls 

@ 100 mls/hr IVPB Q6H [facility]


   Last Admin: 09/17/17 20:21 Dose:  100 mls/hr


Vancomycin/Sodium Chloride (Vancocin)  1 gm in 200 mls @ 133 mls/hr IVPB Q12 [facility]


   Stop: 09/20/17 22:01


   Last Admin: 09/17/17 21:21 Dose:  133 mls/hr


Sodium Chloride (Sodium Chloride 0.9%)  1,000 mls @ 50 mls/hr IV .Q20H [facility]


   Last Admin: 09/17/17 04:20 Dose:  Not Given


Morphine Sulfate (Morphine)  4 mg IVP Q4 PRN


   PRN Reason: Pain, severe (8-10)


   Last Admin: 09/17/17 19:33 Dose:  4 mg


Ondansetron HCl (Zofran Inj)  4 mg IVP Q6 PRN


   PRN Reason: Nausea/Vomiting


   Last Admin: 09/15/17 09:57 Dose:  4 mg


Oxycodone/Acetaminophen (Percocet 5/325 Mg Tab)  1 tab PO Q4H PRN


   PRN Reason: Pain, Mild (1-3)


   Stop: 09/20/17 13:46


Oxycodone/Acetaminophen (Percocet 5/325 Mg Tab)  2 tab PO Q4H PRN


   PRN Reason: Pain, moderate (4-7)


   Stop: 09/20/17 13:46


Pantoprazole Sodium (Protonix Inj)  40 mg IVP DAILY BLADIMIR


   Last Admin: 09/17/17 09:02 Dose:  40 mg











- Labs


Labs: 


 





 09/17/17 08:36 





 09/16/17 07:51 











- Constitutional


Appears: No Acute Distress





- Head Exam


Head Exam: NORMAL INSPECTION





- Eye Exam


Eye Exam: EOMI, PERRL





- ENT Exam


ENT Exam: Normal Oropharynx





- Neck Exam


Neck Exam: Normal Inspection





- Respiratory Exam


Respiratory Exam: Clear to Ausculation Bilateral





- Cardiovascular Exam


Cardiovascular Exam: REGULAR RHYTHM, +S1, +S2





- GI/Abdominal Exam


GI & Abdominal Exam: Soft, Tenderness (RLQ ONDEEP PALPATION.), Normal Bowel 

Sounds





- Extremities Exam


Extremities Exam: absent: Calf Tenderness, Pedal Edema





- Neurological Exam


Neurological Exam: Alert, CN II-XII Intact, Normal Gait, Oriented x3, Reflexes 

Normal





- Psychiatric Exam


Psychiatric exam: Normal Mood





- Skin


Skin Exam: Normal Color, Warm





Assessment and Plan


(1) Acute cecitis


Status: Acute   





(2) Cecal diverticulitis


Status: Acute   





(3) Stercolith of appendix


Status: Acute   





(4) Sleep apnea syndrome


Status: Acute   





- Assessment and Plan (Free Text)


Assessment: 








PLAN:


CONTINUE iv zOSYN 3.375 DECREASE  DOSE  EVERY 8 HOURLY 9/14/17.


ON  IV VANCOMYCIN 1 G EVERY 12 HOURLY FOR GRAM-POSITIVE AND [employer] COVERAGE.


fOLLOW-UP vANCO TROUGH LEVELS PRIOR TO THE FOURTH DOSE AND KEEP THEM BETWEEN 10 

AND 20 IN AM





fOLLOW-UP RENAL FUNCTIONS.





PT WILL NEED A PICC LINE ON MONDAY


WILL SWITCH TO IV INVANZ 1GM IVPB DAILY X14 DAYS IF BLOOD CULTURES REMAIN -VE.


F/U CT ABDOMEN AFTER COMPLETION OF ABX .

## 2017-09-17 NOTE — CP.PCM.PN
Subjective





- Date & Time of Evaluation


Date of Evaluation: 09/17/17


Time of Evaluation: 07:30





- Subjective


Subjective: 





General Surgery- Dr. Chan





Pt S&E at bedside this AM. No acute events overnight. tolerating diet. minimal 

pain. Denies F/C CP/SOB N/V/D





Objective





- Vital Signs/Intake and Output


Vital Signs (last 24 hours): 


 











Temp Pulse Resp BP Pulse Ox


 


 97.5 F L  68   20   137/85   97 


 


 09/17/17 16:44  09/17/17 16:44  09/17/17 16:44  09/17/17 16:44  09/17/17 16:44








Intake and Output: 


 











 09/17/17 09/17/17





 06:59 18:59


 


Intake Total 1380 780


 


Output Total 600 


 


Balance 780 780














- Medications


Medications: 


 Current Medications





Piperacillin Sod/Tazobactam Sod (Zosyn 3.375 Gm Iv Premix)  3.375 gm in 50 mls 

@ 100 mls/hr IVPB Q6H [facility]


   Last Admin: 09/17/17 14:22 Dose:  100 mls/hr


Vancomycin/Sodium Chloride (Vancocin)  1 gm in 200 mls @ 133 mls/hr IVPB Q12 [facility]


   Stop: 09/20/17 22:01


   Last Admin: 09/17/17 10:40 Dose:  133 mls/hr


Sodium Chloride (Sodium Chloride 0.9%)  1,000 mls @ 50 mls/hr IV .Q20H [facility]


   Last Admin: 09/17/17 04:20 Dose:  Not Given


Ondansetron HCl (Zofran Inj)  4 mg IVP Q6 PRN


   PRN Reason: Nausea/Vomiting


   Last Admin: 09/15/17 09:57 Dose:  4 mg


Oxycodone/Acetaminophen (Percocet 5/325 Mg Tab)  1 tab PO Q4H PRN


   PRN Reason: Pain, moderate (4-7)


   Stop: 09/20/17 13:46


   Last Admin: 09/17/17 14:21 Dose:  1 tab


Oxycodone/Acetaminophen (Percocet 5/325 Mg Tab)  2 tab PO Q4H PRN


   PRN Reason: Pain, severe (8-10)


   Stop: 09/20/17 13:46


Pantoprazole Sodium (Protonix Inj)  40 mg IVP DAILY [facility]


   Last Admin: 09/17/17 09:02 Dose:  40 mg











- Labs


Labs: 


 





 09/17/17 08:36 





 09/16/17 07:51 











- Constitutional


Appears: Non-toxic, No Acute Distress





- Eye Exam


Eye Exam: EOMI





- Respiratory Exam


Respiratory Exam: absent: Accessory Muscle Use, Chest Wall Tenderness, Rales, 

Rhonchi





- Cardiovascular Exam


Cardiovascular Exam: +S1, +S2





- GI/Abdominal Exam


GI & Abdominal Exam: Soft, Tenderness.  absent: Distended, Firm, Rigid


Additional comments: 





tender to deep palpation RLQ





- Extremities Exam


Extremities Exam: absent: Calf Tenderness





- Neurological Exam


Neurological Exam: Alert, Awake, Oriented x3





- Psychiatric Exam


Psychiatric exam: Normal Affect





- Skin


Skin Exam: Normal Color, Warm





Assessment and Plan





- Assessment and Plan (Free Text)


Assessment: 





27M w/ stump appendicitis


Plan: 





- IVAbx


- Picc placement tomorrow


- Plan to be D/C on IV abx


- ID recs


- further recs per Dr. Rocio Coto PGY1

## 2017-09-18 NOTE — CP.PCM.PN
Subjective





- Date & Time of Evaluation


Date of Evaluation: 09/18/17


Time of Evaluation: 13:53





- Subjective


Subjective: 





AFEBRILE.


COMFORTABLE.


TOLERATING DIET.





CASE DISCUSSED WITH THE STAFF AND -JACEK..


PATIENT FOR picc LINE.





PATIENT TO RECEIVE IV INVANZ 1 GM  DAILY X14 DAYS ON D/C


F/U 


CBC W DIFF, BMP, LFTS WEEKLY X 2 WKS.








Objective





- Vital Signs/Intake and Output


Vital Signs (last 24 hours): 


 











Temp Pulse Resp BP Pulse Ox


 


 97.5 F L  60   20   97/60 L  98 


 


 09/18/17 08:02  09/18/17 08:02  09/18/17 08:02  09/18/17 08:02  09/18/17 08:02








Intake and Output: 


 











 09/18/17 09/18/17





 06:59 18:59


 


Intake Total 1330 


 


Balance 1330 














- Medications


Medications: 


 Current Medications





Piperacillin Sod/Tazobactam Sod (Zosyn 3.375 Gm Iv Premix)  3.375 gm in 50 mls 

@ 100 mls/hr IVPB Q6H [facility]


   Last Admin: 09/18/17 13:37 Dose:  100 mls/hr


Vancomycin/Sodium Chloride (Vancocin)  1 gm in 200 mls @ 133 mls/hr IVPB Q12 [facility]


   Stop: 09/20/17 22:01


   Last Admin: 09/18/17 10:06 Dose:  133 mls/hr


Sodium Chloride (Sodium Chloride 0.9%)  1,000 mls @ 50 mls/hr IV .Q20H [facility]


   Last Admin: 09/18/17 02:41 Dose:  Not Given


Morphine Sulfate (Morphine)  4 mg IVP Q4 PRN


   PRN Reason: Pain, severe (8-10)


   Last Admin: 09/17/17 19:33 Dose:  4 mg


Ondansetron HCl (Zofran Inj)  4 mg IVP Q6 PRN


   PRN Reason: Nausea/Vomiting


   Last Admin: 09/15/17 09:57 Dose:  4 mg


Oxycodone/Acetaminophen (Percocet 5/325 Mg Tab)  1 tab PO Q4H PRN


   PRN Reason: Pain, Mild (1-3)


   Stop: 09/20/17 13:46


Oxycodone/Acetaminophen (Percocet 5/325 Mg Tab)  2 tab PO Q4H PRN


   PRN Reason: Pain, moderate (4-7)


   Stop: 09/20/17 13:46


Pantoprazole Sodium (Protonix Inj)  40 mg IVP DAILY [facility]


   Last Admin: 09/18/17 10:08 Dose:  40 mg











- Labs


Labs: 


 





 09/18/17 09:01 





 09/16/17 07:51 











Assessment and Plan


(1) Acute cecitis


Status: Acute   





(2) Cecal diverticulitis


Status: Acute   





(3) Stercolith of appendix


Status: Acute   





(4) Sleep apnea syndrome


Status: Acute   





- Assessment and Plan (Free Text)


Plan: 





PT WILL NEED A PICC LINE DISCUSSED WITH STAFF.


WILL SWITCH TO IV INVANZ 1GM IVPB DAILY X14 DAYS IN AM.


F/U  CBC W DIF,BMP AND LFTS ONCE WEEKLY AND NOTIFY MD.

## 2017-09-18 NOTE — CP.PCM.PN
Subjective





- Date & Time of Evaluation


Date of Evaluation: 09/18/17


Time of Evaluation: 07:32





- Subjective


Subjective: 


Patient was seen and examined at bedside in no acute distress. Patient was 

sleeping comfortably. He reports he feels better and has no complaints. He 

states he had 2 loose bowel movements yesterday. 12 point review of systems 

otherwise negative. 





Objective





- Vital Signs/Intake and Output


Vital Signs (last 24 hours): 


 











Temp Pulse Resp BP Pulse Ox


 


 98.1 F   67   20   116/61   98 


 


 09/18/17 00:00  09/18/17 00:00  09/18/17 00:00  09/18/17 00:00  09/18/17 00:00








Intake and Output: 


 











 09/18/17 09/18/17





 06:59 18:59


 


Intake Total 1330 


 


Balance 1330 














- Medications


Medications: 


 Current Medications





Piperacillin Sod/Tazobactam Sod (Zosyn 3.375 Gm Iv Premix)  3.375 gm in 50 mls 

@ 100 mls/hr IVPB Q6H [facility]


   Last Admin: 09/18/17 03:15 Dose:  100 mls/hr


Vancomycin/Sodium Chloride (Vancocin)  1 gm in 200 mls @ 133 mls/hr IVPB Q12 [facility]


   Stop: 09/20/17 22:01


   Last Admin: 09/17/17 21:21 Dose:  133 mls/hr


Sodium Chloride (Sodium Chloride 0.9%)  1,000 mls @ 50 mls/hr IV .Q20H [facility]


   Last Admin: 09/18/17 02:41 Dose:  Not Given


Morphine Sulfate (Morphine)  4 mg IVP Q4 PRN


   PRN Reason: Pain, severe (8-10)


   Last Admin: 09/17/17 19:33 Dose:  4 mg


Ondansetron HCl (Zofran Inj)  4 mg IVP Q6 PRN


   PRN Reason: Nausea/Vomiting


   Last Admin: 09/15/17 09:57 Dose:  4 mg


Oxycodone/Acetaminophen (Percocet 5/325 Mg Tab)  1 tab PO Q4H PRN


   PRN Reason: Pain, Mild (1-3)


   Stop: 09/20/17 13:46


Oxycodone/Acetaminophen (Percocet 5/325 Mg Tab)  2 tab PO Q4H PRN


   PRN Reason: Pain, moderate (4-7)


   Stop: 09/20/17 13:46


Pantoprazole Sodium (Protonix Inj)  40 mg IVP DAILY [facility]


   Last Admin: 09/17/17 09:02 Dose:  40 mg











- Labs


Labs: 


 





 09/17/17 08:36 





 09/16/17 07:51 











- Head Exam


Head Exam: ATRAUMATIC, NORMAL INSPECTION





- Eye Exam


Eye Exam: EOMI, Normal appearance





- ENT Exam


ENT Exam: Mucous Membranes Moist





- Respiratory Exam


Respiratory Exam: Clear to Ausculation Bilateral, NORMAL BREATHING PATTERN.  

absent: Rhonchi, Wheezes





- Cardiovascular Exam


Cardiovascular Exam: REGULAR RHYTHM, +S1, +S2





- GI/Abdominal Exam


GI & Abdominal Exam: Soft, Tenderness (RLQ), Normal Bowel Sounds





- Extremities Exam


Extremities Exam: Normal Inspection.  absent: Pedal Edema, Tenderness





- Neurological Exam


Neurological Exam: Alert, Awake, Oriented x3





- Psychiatric Exam


Psychiatric exam: Normal Affect, Normal Mood





- Skin


Skin Exam: Dry, Intact, Normal Color, Warm





Assessment and Plan





- Assessment and Plan (Free Text)


Assessment: 


27 year old male with stump appendicitis.





- PICC line placement scheduled for today.


- Continue medical management.


- No surgical intervention at this time.

## 2017-09-18 NOTE — CP.PCM.PN
Subjective





- Date & Time of Evaluation


Date of Evaluation: 09/18/17


Time of Evaluation: 13:20





- Subjective


Subjective: 





PT CONGRUOUS TO HAVE  PAIN IN RLQ 


ON MS ROUND THE CLOCK 


PICC LINE 


FOR IV AB AS OUT PT 





Objective





- Vital Signs/Intake and Output


Vital Signs (last 24 hours): 


 











Temp Pulse Resp BP Pulse Ox


 


 97.5 F L  60   20   97/60 L  98 


 


 09/18/17 08:02  09/18/17 08:02  09/18/17 08:02  09/18/17 08:02  09/18/17 08:02








Intake and Output: 


 











 09/18/17 09/18/17





 11:59 23:59


 


Intake Total 530 


 


Balance 530 














- Medications


Medications: 


 Current Medications





Piperacillin Sod/Tazobactam Sod (Zosyn 3.375 Gm Iv Premix)  3.375 gm in 50 mls 

@ 100 mls/hr IVPB Q6H [facility]


   Last Admin: 09/18/17 08:37 Dose:  100 mls/hr


Vancomycin/Sodium Chloride (Vancocin)  1 gm in 200 mls @ 133 mls/hr IVPB Q12 [facility]


   Stop: 09/20/17 22:01


   Last Admin: 09/18/17 10:06 Dose:  133 mls/hr


Sodium Chloride (Sodium Chloride 0.9%)  1,000 mls @ 50 mls/hr IV .Q20H [facility]


   Last Admin: 09/18/17 02:41 Dose:  Not Given


Morphine Sulfate (Morphine)  4 mg IVP Q4 PRN


   PRN Reason: Pain, severe (8-10)


   Last Admin: 09/17/17 19:33 Dose:  4 mg


Ondansetron HCl (Zofran Inj)  4 mg IVP Q6 PRN


   PRN Reason: Nausea/Vomiting


   Last Admin: 09/15/17 09:57 Dose:  4 mg


Oxycodone/Acetaminophen (Percocet 5/325 Mg Tab)  1 tab PO Q4H PRN


   PRN Reason: Pain, Mild (1-3)


   Stop: 09/20/17 13:46


Oxycodone/Acetaminophen (Percocet 5/325 Mg Tab)  2 tab PO Q4H PRN


   PRN Reason: Pain, moderate (4-7)


   Stop: 09/20/17 13:46


Pantoprazole Sodium (Protonix Inj)  40 mg IVP DAILY [facility]


   Last Admin: 09/18/17 10:08 Dose:  40 mg











- Labs


Labs: 


 





 09/18/17 09:01 





 09/16/17 07:51

## 2017-09-19 NOTE — PCM.SURG1
Surgeon's Initial Post Op Note





- Surgeon's Notes


Surgeon: Hien


Assistant: YOANDY schmidt


Type of Anesthesia: Local


Pre-Operative Diagnosis: Infection


Operative Findings: Patent right basilic vein


Post-Operative Diagnosis: Infection


Operation Performed: Right basilic vein 4F SL 36cm PICC placed with tip at the 

RA/SVC junction.


Specimen/Specimens Removed: None


Estimated Blood Loss: EBL {In ML}: 1


Date of Surgery/Procedure: 09/19/17


Time of Surgery/Procedure: 17:25

## 2017-09-19 NOTE — CP.PCM.PN
Subjective





- Date & Time of Evaluation


Date of Evaluation: 09/19/17


Time of Evaluation: 07:44





- Subjective


Subjective: 





Surgery 





Pt s&e. NAEON. Afebrile. Denies F/C/N/V/D/CP/SOB. + amb. + tolerating diet. + 

void. + BM





Objective





- Vital Signs/Intake and Output


Vital Signs (last 24 hours): 


 











Temp Pulse Resp BP Pulse Ox


 


 97.4 F L  68   20   134/80   98 


 


 09/19/17 00:00  09/19/17 00:00  09/19/17 00:00  09/19/17 00:00  09/19/17 00:00








Intake and Output: 


 











 09/19/17 09/19/17





 06:59 18:59


 


Intake Total 1425 


 


Balance 1425 














- Medications


Medications: 


 Current Medications





Piperacillin Sod/Tazobactam Sod (Zosyn 3.375 Gm Iv Premix)  3.375 gm in 50 mls 

@ 100 mls/hr IVPB Q6H [facility]


   Last Admin: 09/19/17 02:20 Dose:  100 mls/hr


Sodium Chloride (Sodium Chloride 0.9%)  1,000 mls @ 50 mls/hr IV .Q20H [facility]


   Last Admin: 09/19/17 06:00 Dose:  50 mls/hr


Ertapenem 1 gm/ Sodium (Chloride)  100 mls @ 100 mls/hr IV ONCE ONE


   Stop: 09/19/17 11:44


Morphine Sulfate (Morphine)  4 mg IVP Q4 PRN


   PRN Reason: Pain, severe (8-10)


   Last Admin: 09/18/17 19:51 Dose:  4 mg


Ondansetron HCl (Zofran Inj)  4 mg IVP Q6 PRN


   PRN Reason: Nausea/Vomiting


   Last Admin: 09/15/17 09:57 Dose:  4 mg


Oxycodone/Acetaminophen (Percocet 5/325 Mg Tab)  1 tab PO Q4H PRN


   PRN Reason: Pain, Mild (1-3)


   Stop: 09/20/17 13:46


Oxycodone/Acetaminophen (Percocet 5/325 Mg Tab)  2 tab PO Q4H PRN


   PRN Reason: Pain, moderate (4-7)


   Stop: 09/20/17 13:46


   Last Admin: 09/19/17 06:10 Dose:  2 tab


Pantoprazole Sodium (Protonix Inj)  40 mg IVP DAILY [facility]


   Last Admin: 09/18/17 10:08 Dose:  40 mg











- Labs


Labs: 


 





 09/18/17 09:01 





 09/16/17 07:51 











- Constitutional


Appears: No Acute Distress





- Head Exam


Head Exam: ATRAUMATIC, NORMAL INSPECTION, NORMOCEPHALIC





- Eye Exam


Eye Exam: EOMI, Normal appearance, PERRL


Pupil Exam: NORMAL ACCOMODATION, PERRL





- ENT Exam


ENT Exam: Mucous Membranes Moist, Normal Exam





- Neck Exam


Neck Exam: Full ROM, Normal Inspection.  absent: Lymphadenopathy





- Respiratory Exam


Respiratory Exam: Clear to Ausculation Bilateral, NORMAL BREATHING PATTERN





- Cardiovascular Exam


Cardiovascular Exam: REGULAR RHYTHM, +S1, +S2.  absent: Murmur





- GI/Abdominal Exam


GI & Abdominal Exam: Soft, Normal Bowel Sounds.  absent: Distended, Firm, 

Guarding, Tenderness





- Extremities Exam


Extremities Exam: Full ROM, Normal Capillary Refill, Normal Inspection.  absent

: Joint Swelling, Pedal Edema





- Back Exam


Back Exam: NORMAL INSPECTION





- Neurological Exam


Neurological Exam: Alert, Awake, CN II-XII Intact, Normal Gait, Oriented x3





- Psychiatric Exam


Psychiatric exam: Normal Affect, Normal Mood





- Skin


Skin Exam: Dry, Intact, Normal Color, Warm





Assessment and Plan





- Assessment and Plan (Free Text)


Assessment: 





27 year old male with stump appendicitis.





-OK to DC for surgical standpoint. 


- PICC line placement scheduled for today.


- No surgical intervention at this time.





Will FANI Chan

## 2017-09-19 NOTE — CP.PCM.PN
Subjective





- Date & Time of Evaluation


Date of Evaluation: 09/19/17


Time of Evaluation: 16:08





- Subjective


Subjective: 





Alert, orientedx3, denies acute pain or distress.





Objective





- Vital Signs/Intake and Output


Vital Signs (last 24 hours): 


 











Temp Pulse Resp BP Pulse Ox


 


 97.6 F   62   20   117/73   97 


 


 09/19/17 08:10  09/19/17 08:10  09/19/17 08:10  09/19/17 08:10  09/19/17 08:10








Intake and Output: 


 











 09/19/17 09/19/17





 06:59 18:59


 


Intake Total 1425 


 


Balance 1425 














- Medications


Medications: 


 Current Medications





Morphine Sulfate (Morphine)  4 mg IVP Q4 PRN


   PRN Reason: Pain, severe (8-10)


   Last Admin: 09/18/17 19:51 Dose:  4 mg


Ondansetron HCl (Zofran Inj)  4 mg IVP Q6 PRN


   PRN Reason: Nausea/Vomiting


   Last Admin: 09/15/17 09:57 Dose:  4 mg


Oxycodone/Acetaminophen (Percocet 5/325 Mg Tab)  1 tab PO Q4H PRN


   PRN Reason: Pain, Mild (1-3)


   Stop: 09/20/17 13:46


Oxycodone/Acetaminophen (Percocet 5/325 Mg Tab)  2 tab PO Q4H PRN


   PRN Reason: Pain, moderate (4-7)


   Stop: 09/20/17 13:46


   Last Admin: 09/19/17 06:10 Dose:  2 tab


Pantoprazole Sodium (Protonix Inj)  40 mg IVP DAILY [facility]


   Last Admin: 09/19/17 10:06 Dose:  40 mg











- Labs


Labs: 


 





 09/18/17 09:01 





 09/16/17 07:51 











Assessment and Plan





- Assessment and Plan (Free Text)


Assessment: 





Resident  seen and examined. Denies abdominal pain or distress. D/W DR Coles, 

plan to discharge home on invanz 1 gm daily as outpatient infusion. Awaiting 

for PICC line placement prior to discharge. Will get Medical clinic follow up 

appointment in 2 weeks.

## 2017-09-19 NOTE — CP.PCM.DIS
Provider





- Provider


Date of Admission: 


09/14/17 11:22





Attending physician: 


Kenyatta Coles MD





Time Spent in preparation of Discharge (in minutes): 35





Hospital Course





- Lab Results


Lab Results: 


 Micro Results





09/14/17 07:00   Blood   Blood Culture - Preliminary


                            NO GROWTH AFTER 4 DAYS


09/14/17 07:30   Blood   Blood Culture - Preliminary


                            NO GROWTH AFTER 4 DAYS





 Most Recent Lab Values











WBC  6.7 K/uL (4.8-10.8)   09/18/17  09:01    


 


RBC  5.26 Mil/uL (4.40-5.90)   09/18/17  09:01    


 


Hgb  16.3 g/dL (12.0-18.0)   09/18/17  09:01    


 


Hct  48.6 % (35.0-51.0)   09/18/17  09:01    


 


MCV  92.2 fL (80.0-94.0)   09/18/17  09:01    


 


MCH  31.0 pg (27.0-31.0)   09/18/17  09:01    


 


MCHC  33.6 g/dL (33.0-37.0)   09/18/17  09:01    


 


RDW  13.2 % (11.5-14.5)   09/18/17  09:01    


 


Plt Count  328 K/uL (130-400)   09/18/17  09:01    


 


MPV  7.4 fL (7.2-11.7)   09/18/17  09:01    


 


Neut % (Auto)  62.7 % (50.0-75.0)   09/16/17  07:51    


 


Lymph % (Auto)  23.8 % (20.0-40.0)   09/16/17  07:51    


 


Mono % (Auto)  10.0 % (0.0-10.0)   09/16/17  07:51    


 


Eos % (Auto)  3.1 % (0.0-4.0)   09/16/17  07:51    


 


Baso % (Auto)  0.4 % (0.0-2.0)   09/16/17  07:51    


 


Neut #  5.5 K/uL (1.8-7.0)   09/16/17  07:51    


 


Lymph #  2.1 K/uL (1.0-4.3)   09/16/17  07:51    


 


Mono #  0.9 K/uL (0.0-0.8)  H  09/16/17  07:51    


 


Eos #  0.3 K/uL (0.0-0.7)   09/16/17  07:51    


 


Baso #  0.0 K/uL (0.0-0.2)   09/16/17  07:51    


 


Sodium  138 mmol/L (132-148)   09/16/17  07:51    


 


Potassium  3.7 mmol/L (3.6-5.2)   09/16/17  07:51    


 


Chloride  101 mmol/L ()   09/16/17  07:51    


 


Carbon Dioxide  27 mmol/L (22-30)   09/16/17  07:51    


 


Anion Gap  14  (10-20)   09/16/17  07:51    


 


BUN  9 mg/dL (9-20)   09/16/17  07:51    


 


Creatinine  0.8 MG/DL (0.8-1.5)   09/16/17  07:51    


 


Est GFR ( Amer)  > 60   09/16/17  07:51    


 


Est GFR (Non-Af Amer)  > 60   09/16/17  07:51    


 


Random Glucose  66 mg/dL ()  L  09/16/17  07:51    


 


Calcium  8.9 mg/dl (8.6-10.4)   09/16/17  07:51    


 


Total Bilirubin  2.5 mg/dL (0.2-1.3)  H  09/16/17  07:51    


 


Direct Bilirubin  0.1 mg/dL (0.0-0.4)   09/16/17  07:51    


 


AST  24 U/L (17-59)   09/16/17  07:51    


 


ALT  34 U/L (21-72)   09/16/17  07:51    


 


Alkaline Phosphatase  62 U/L ()   09/16/17  07:51    


 


Total Protein  6.9 g/dL (6.3-8.3)   09/16/17  07:51    


 


Albumin  3.8 g/dL (3.5-5.0)   09/16/17  07:51    


 


Globulin  3.2 gm/dL (2.2-3.9)   09/16/17  07:51    


 


Albumin/Globulin Ratio  1.2  (1.0-2.1)   09/16/17  07:51    


 


Urine Color  Yellow  (YELLOW)   09/14/17  05:46    


 


Urine Clarity  Clear  (Clear)   09/14/17  05:46    


 


Urine pH  7.0  (5.0-8.0)   09/14/17  05:46    


 


Ur Specific Gravity  1.020  (1.003-1.030)   09/14/17  05:46    


 


Urine Protein  Negative mg/dL (NEGATIVE)   09/14/17  05:46    


 


Urine Glucose (UA)  Normal mg/dL (Normal)   09/14/17  05:46    


 


Urine Ketones  Negative mg/dL (NEGATIVE)   09/14/17  05:46    


 


Urine Blood  Negative  (NEGATIVE)   09/14/17  05:46    


 


Urine Nitrate  Negative  (NEGATIVE)   09/14/17  05:46    


 


Urine Bilirubin  Negative  (NEGATIVE)   09/14/17  05:46    


 


Urine Urobilinogen  2.0 mg/dL (0.2-1.0)   09/14/17  05:46    


 


Ur Leukocyte Esterase  Neg Tom/uL (Negative)   09/14/17  05:46    


 


Urine WBC (Auto)  < 1 /hpf (0-5)   09/14/17  05:46    


 


Urine RBC (Auto)  < 1 /hpf (0-3)   09/14/17  05:46    


 


Vancomycin Trough  8.9 ug/mL (5.0-10.0)   09/18/17  09:52    


 


Hepatitis A IgM Ab  Negative  (NEGATIVE)   09/16/17  07:51    


 


Hep Bs Antigen  Negative  (NEGATIVE)   09/16/17  07:51    


 


Hep B Core IgM Ab  Negative  (NEGATIVE)   09/16/17  07:51    


 


Hepatitis C Antibody  Negative  (NEGATIVE)   09/16/17  07:51    














- Hospital Course


Hospital Course: 





RLQ PAIN SINCE LAST 48 HRS NOT A/W N/V/FEVER/CHILLS 


CT ABD. INFLAMMATION OF STUMP APPENDIX WITH FECOLITH 


HAD APPENDIDECTOMY FEW YEARS AGO





PAST HIST.


DAISY/ANXIETY/FX LEFT FOOT 





ID/SURGERY ON CONSULT 


PT IMPROVED ON IV AB 


SURGERY DEFERRED SUGERICAL INTERVENTION 


REPEAT CT SHOWED PHELGMOUN 


PT WILL RECEIVE IV AB FOR 2 WEEKS AS OUT PT 


WILL F/U IN MEDICAL CLINIC FOR REPEAT CT IF PAIN 





Discharge Exam





- Head Exam


Head Exam: ATRAUMATIC, NORMAL INSPECTION, NORMOCEPHALIC





Discharge Plan





- Follow Up Plan


Condition: STABLE


Disposition: HOME/ ROUTINE

## 2019-03-08 ENCOUNTER — HOSPITAL ENCOUNTER (OUTPATIENT)
Dept: HOSPITAL 31 - C.CTH | Age: 29
End: 2019-03-08
Payer: MEDICAID

## 2019-04-30 ENCOUNTER — HOSPITAL ENCOUNTER (OUTPATIENT)
Dept: HOSPITAL 31 - C.SDS | Age: 29
Discharge: HOME | End: 2019-04-30
Attending: OTOLARYNGOLOGY
Payer: MEDICAID

## 2019-04-30 ENCOUNTER — HOSPITAL ENCOUNTER (EMERGENCY)
Dept: HOSPITAL 31 - C.ER | Age: 29
Discharge: HOME | End: 2019-04-30
Payer: MEDICAID

## 2019-04-30 VITALS
OXYGEN SATURATION: 100 % | RESPIRATION RATE: 15 BRPM | SYSTOLIC BLOOD PRESSURE: 158 MMHG | DIASTOLIC BLOOD PRESSURE: 94 MMHG | HEART RATE: 61 BPM

## 2019-04-30 VITALS — RESPIRATION RATE: 18 BRPM

## 2019-04-30 VITALS
TEMPERATURE: 97.7 F | OXYGEN SATURATION: 97 % | HEART RATE: 76 BPM | SYSTOLIC BLOOD PRESSURE: 115 MMHG | DIASTOLIC BLOOD PRESSURE: 58 MMHG

## 2019-04-30 VITALS — BODY MASS INDEX: 24.3 KG/M2

## 2019-04-30 VITALS — TEMPERATURE: 98.2 F

## 2019-04-30 DIAGNOSIS — R04.0: Primary | ICD-10-CM

## 2019-04-30 DIAGNOSIS — J32.0: ICD-10-CM

## 2019-04-30 DIAGNOSIS — J32.2: ICD-10-CM

## 2019-04-30 DIAGNOSIS — J32.3: ICD-10-CM

## 2019-04-30 DIAGNOSIS — J34.3: ICD-10-CM

## 2019-04-30 DIAGNOSIS — J34.2: Primary | ICD-10-CM

## 2019-04-30 PROCEDURE — 31050 EXPLORATION SPHENOID SINUS: CPT

## 2019-04-30 PROCEDURE — 31020 EXPLORATION MAXILLARY SINUS: CPT

## 2019-04-30 PROCEDURE — 61781 SCAN PROC CRANIAL INTRA: CPT

## 2019-04-30 PROCEDURE — 88304 TISSUE EXAM BY PATHOLOGIST: CPT

## 2019-04-30 PROCEDURE — 31201 REMOVAL OF ETHMOID SINUS: CPT

## 2019-04-30 PROCEDURE — 30130 EXCISE INFERIOR TURBINATE: CPT

## 2019-04-30 PROCEDURE — 30520 REPAIR OF NASAL SEPTUM: CPT

## 2019-04-30 RX ADMIN — HYDROMORPHONE HYDROCHLORIDE PRN MG: 1 INJECTION, SOLUTION INTRAMUSCULAR; INTRAVENOUS; SUBCUTANEOUS at 12:04

## 2019-04-30 RX ADMIN — HYDROMORPHONE HYDROCHLORIDE PRN MG: 1 INJECTION, SOLUTION INTRAMUSCULAR; INTRAVENOUS; SUBCUTANEOUS at 12:28

## 2019-04-30 RX ADMIN — HYDROMORPHONE HYDROCHLORIDE PRN MG: 1 INJECTION, SOLUTION INTRAMUSCULAR; INTRAVENOUS; SUBCUTANEOUS at 11:44

## 2019-04-30 NOTE — OP
PROCEDURE DATE:  04/30/2019



PREOPERATIVE DIAGNOSES:  Deviated septum, large turbinates, sinusitis.



POSTOPERATIVE DIAGNOSES:  Deviated septum, large turbinates, sinusitis.



PROCEDURES:  Septoplasty, endoscopic bilateral inferior turbinate

reduction, endoscopic bilateral maxillary antrostomy, endoscopic bilateral

ethmoidectomy, and endoscopic bilateral sphenoidotomy.



FINDINGS:  Deviated septum, large inferior turbinates, maxillary antrum

stenosed on both sides, sinusitis changes noted of the ethmoids on both

sides, sphenoid antrum stenosed on both sides.



DESCRIPTION OF PROCEDURE:  The patient was brought into room, placed in

supine position, anesthesia initiated through an ET tube.  The patient was

draped in the usual manner.  Navigation was set up and used throughout the

case in order to ensure that the skull base and orbit were not entered. 

Adrenaline-soaked pledgets were inserted into nasal cavity, remained there

for 5 minutes and removed.  The septum was injected with lidocaine with

epinephrine.  A hemitransfixion incision was made on the left and

mucoperichondrial flap was raised on the left.  A vertical incision was

made in the cartilage leaving a 1.5 cm anterior and superior strut and

mucoperichondrial flap was raised on the other side.  Deviated portion of

the bone and cartilage were removed using forceps and chisel.  The two

flaps were sutured together using a quilting suture.  Next, a 0-degree

scope was inserted into the nasal cavity.  The inferior turbinates were

noted to be enlarged and reduced in size using scissors going from an

inferior to superior, anterior to posterior direction on both sides, first

on the left, then on the right.  Bleeding was controlled using suction

cautery.  The middle turbinates were injected with lidocaine with

epinephrine on both sides and attention was turned to the left.   The

middle turbinate was medialized.  The uncinate process was medialized using

a [place] elevator and removed using forceps.  A debrider was used to enter

the ethmoid bulla inferomedially going posteriorly to the basal lamella,

then anteriorly and superiorly until the ethmoid bulla was removed.  The

basal lamella was entered.  Posterior ethmoid cells were entered and

opened.  The skull base was identified and followed anteriorly all the way

to the area of the anterior ethmoid air cells.  A curved suction hooked up

navigation was used to locate the maxillary antrum which was noted to be

stenosed and opened using forceps.

A straight suction hooked up navigation was used to locate the sphenoid

antrum which was noted to be stenosed and opened using forceps.  Bleeding

was controlled using suction cautery and adrenaline-soaked pledgets. 

Attention was turned to the other side.  The middle turbinate was

medialized.  The uncinate process was medialized and removed.  The ethmoid

bulla was entered inferomedially going posteriorly to the basal lamella,

then anteriorly and superiorly until the ethmoid bulla was removed.  The

basal lamella was entered.  The posterior ethmoid cells were entered and

opened.  The skull base was identified and followed anteriorly all the way

to the area of anterior ethmoid air cells.   A curved suction hooked up

navigation was used to locate the maxillary antrum which was opened using

forceps.  The sphenoid antrum was located using a straight suction hooked

up navigation and opened using forceps.  Bleeding was controlled using

suction cautery and adrenaline-soaked pledgets.  Splints were placed. 

Stents  were placed.  The patient was taken off anesthesia and taken to

recovery in stable manner.





__________________________________________

Babak Behin, MD





DD:  04/30/2019 11:16:03

DT:  04/30/2019 11:22:34

Job # [id number]

## 2019-04-30 NOTE — C.PDOC
History Of Present Illness


29 year old male presents to the ED for evaluation of nose bleed. Patient is s/p

sinus surgery done by Dr. Behin today at 09:20, patient was discharged same day 

at 13:00. Patient reports he has been bleeding throughout the day since the 

surgery. Patient states 2 hours PTA he noticed two large clots and brown 

material coming out. Patient denies headache, visual changes, neck pain, fever, 

chills, dizziness. 


Time Seen by Provider: 04/30/19 20:44


Chief Complaint (Nursing): ENT Problem


History Per: Patient


History/Exam Limitations: None


Onset/Duration Of Symptoms: Hrs


Current Symptoms Are (Timing): Still Present


Severity: None


Anticoagulant/Antiplatlet Use?: No


Recent Aspirin Use: No





Past Medical History


Reviewed: Historical Data, Nursing Documentation, Vital Signs


Vital Signs: 





                                Last Vital Signs











Temp  98.2 F   04/30/19 20:39


 


Pulse  61   04/30/19 22:38


 


Resp  15   04/30/19 22:38


 


BP  158/94 H  04/30/19 22:38


 


Pulse Ox  100   04/30/19 22:38











Primary Care Provider: Sil Westbrook M





- Medical History


PMH: Anxiety (SOCIAL)


   Denies: Chronic Kidney Disease


Surgical History: Appendectomy (X 2 SURGERIES), Tonsillectomy





- [facility] Procedures











INSERTION OF INFUSION DEV INTO SUP VENA CAVA, PERC APPROACH (09/14/17)








Family History: States: Unknown Family Hx





- Social History


Hx Alcohol Use: Yes


Hx Substance Use: No





- Immunization History


Hx Tetanus Toxoid Vaccination: No


Hx Influenza Vaccination: Yes


Hx Pneumococcal Vaccination: No





Review Of Systems


Constitutional: Negative for: Fever, Chills


ENT: Positive for: Nose Pain, Nose Discharge


Respiratory: Negative for: Cough, Shortness of Breath


Gastrointestinal: Negative for: Nausea, Vomiting


Skin: Negative for: Rash


Neurological: Negative for: Headache





Physical Exam





- Physical Exam


Appears: Non-toxic, No Acute Distress


Skin: Normal Color, Warm, Dry


Head: Atraumatic, Normacephalic


Eye(s): bilateral: Normal Inspection


Ear(s): Bilateral: Normal


Nose: Other (scant amount of blood in bilateral nares)


Oral Mucosa: Moist


Tongue: No Bleeding


Lips: No Laceration


Teeth: Normal Dentition


Gingiva: No Bleeding


Throat: Normal, No Erythema, No Exudate, Other (blood seen in posterior pharynx)


Neck: Normal ROM, Supple


Chest: Symmetrical


Cardiovascular: Rhythm Regular


Respiratory: Normal Breath Sounds, No Rales, No Rhonchi, No Wheezing


Extremity: Normal ROM, No Tenderness, No Swelling


Neurological/Psych: Oriented x3, Normal Speech, Normal Cognition


Gait: Steady





ED Course And Treatment


O2 Sat by Pulse Oximetry: 100 (ON RA)


Pulse Ox Interpretation: Normal





Medical Decision Making


Medical Decision Making: 


Plan:


* Percocet 1 tab PO





Case was reviewed and discussed with Dr. Behin. Dr. Behin states that symptoms 

are normal after procedure, does not require any further intervention at this 

time. Patient was observed in the ED for any bleeding or worsening symptoms. 

Patient was stable for D/C and advised to follow up with Dr. Behin





Disposition





- Disposition


Referrals: 


Behin,Babak, MD [Staff Provider] - 


Disposition: HOME/ ROUTINE


Disposition Time: 23:15


Condition: GOOD


Additional Instructions: 


SEE Dr. Behin tomorrow at the [facility] without fail. Return if 

worsened. 


Prescriptions: 


oxyCODONE/Acetaminophen [Percocet 5/325 mg Tab] 1 tab PO QID PRN #10 tab


 PRN Reason: Pain


Instructions:  Nosebleeds (DC)


Forms:  [facility] (English)





- Clinical Impression


Clinical Impression: 


 Epistaxis








- PA / NP / Resident Statement


MD/DO has reviewed & agrees with the documentation as recorded.





- Scribe Statement


The provider has reviewed the documentation as recorded by the Scribe


Adam Hutchinson





All medical record entries made by the Scribe were at my direction and 

personally dictated by me. I have reviewed the chart and agree that the record 

accurately reflects my personal performance of the history, physical exam, 

medical decision making, and the department course for this patient. I have also

personally directed, reviewed, and agree with the discharge instructions and 

disposition.